# Patient Record
Sex: FEMALE | Race: WHITE | Employment: FULL TIME | URBAN - METROPOLITAN AREA
[De-identification: names, ages, dates, MRNs, and addresses within clinical notes are randomized per-mention and may not be internally consistent; named-entity substitution may affect disease eponyms.]

---

## 2019-09-11 ENCOUNTER — OFFICE VISIT (OUTPATIENT)
Dept: FAMILY MEDICINE CLINIC | Facility: CLINIC | Age: 44
End: 2019-09-11
Payer: COMMERCIAL

## 2019-09-11 VITALS
SYSTOLIC BLOOD PRESSURE: 116 MMHG | WEIGHT: 129.38 LBS | TEMPERATURE: 97 F | OXYGEN SATURATION: 99 % | HEIGHT: 63 IN | RESPIRATION RATE: 16 BRPM | BODY MASS INDEX: 22.93 KG/M2 | HEART RATE: 60 BPM | DIASTOLIC BLOOD PRESSURE: 64 MMHG

## 2019-09-11 DIAGNOSIS — Z97.5 IUD (INTRAUTERINE DEVICE) IN PLACE: ICD-10-CM

## 2019-09-11 DIAGNOSIS — Z00.00 ANNUAL PHYSICAL EXAM: Primary | ICD-10-CM

## 2019-09-11 PROCEDURE — 87624 HPV HI-RISK TYP POOLED RSLT: CPT | Performed by: FAMILY MEDICINE

## 2019-09-11 PROCEDURE — 88175 CYTOPATH C/V AUTO FLUID REDO: CPT | Performed by: FAMILY MEDICINE

## 2019-09-11 PROCEDURE — 99386 PREV VISIT NEW AGE 40-64: CPT | Performed by: FAMILY MEDICINE

## 2019-09-11 NOTE — PROGRESS NOTES
CC: Annual Physical Exam    HPI:   David Soni is a 45year old female who presents for a complete physical exam. Symptoms: periods are regular. Pt with need for health care.  Unsure when IUD placed , unsure if it was good for 5 or 10 years-- divorce 5 yellow sclerae. Ears, Nose, Throat:  Denies hearing loss, sneezing, congestion, runny nose or sore throat. INTEGUMENTARY:  Denies rashes, itching, skin lesion, or excessive skin dryness.   CARDIOVASCULAR:  Denies chest pain, chest pressure, chest discomfor turgor. HEART:  Regular rate and rhythm, no murmurs, rubs or gallops. LUNGS: Clear to auscultation bilaterally, no rales/rhonchi/wheezing. BREAST: No tenderness, no masses, no lesion, no nipple discharge, no axillary lymphadopathy  CHEST: No tenderness. fall unless you have a contraindication. Pt' s weight is Body mass index is 22.92 kg/m². , recommended low fat diet and aerobic exercise 30 minutes three times weekly. The patient indicates understanding of these issues and agrees to the plan.   The christiana

## 2019-09-11 NOTE — PATIENT INSTRUCTIONS
rec fasting labs    continue healthy diet and exercsise    Pap today    D/w patient getting records on date and type of IUD

## 2019-09-12 LAB — HPV I/H RISK 1 DNA SPEC QL NAA+PROBE: NEGATIVE

## 2019-09-13 ENCOUNTER — TELEPHONE (OUTPATIENT)
Dept: FAMILY MEDICINE CLINIC | Facility: CLINIC | Age: 44
End: 2019-09-13

## 2019-09-13 NOTE — TELEPHONE ENCOUNTER
Pt was seen on 9/11/19. Please see fax from [facility]. Pt had Paragard placed on 2/6/12. Exp: 7/17. Pt states she was confused. Pt states she thought device was good for 10 years, but then she noticed to expiration date for device.

## 2019-09-13 NOTE — TELEPHONE ENCOUNTER
paraguard is good for 10 years,  Expiration date Is in relation to insertion date.    Pt should have removed/ replaced in 2022

## 2019-09-17 ENCOUNTER — TELEPHONE (OUTPATIENT)
Dept: FAMILY MEDICINE CLINIC | Facility: CLINIC | Age: 44
End: 2019-09-17

## 2019-09-17 NOTE — TELEPHONE ENCOUNTER
----- Message from Navi Sanchez MD sent at 9/17/2019  1:58 PM CDT -----  Normal pap, negative HPV screen.  Repeat 3-5 years

## 2019-09-28 ENCOUNTER — LABORATORY ENCOUNTER (OUTPATIENT)
Dept: LAB | Age: 44
End: 2019-09-28
Attending: FAMILY MEDICINE
Payer: COMMERCIAL

## 2019-09-28 DIAGNOSIS — Z00.00 ANNUAL PHYSICAL EXAM: ICD-10-CM

## 2019-09-28 LAB
ALBUMIN SERPL-MCNC: 3.9 G/DL (ref 3.4–5)
ALBUMIN/GLOB SERPL: 1.2 {RATIO} (ref 1–2)
ALP LIVER SERPL-CCNC: 56 U/L (ref 37–98)
ALT SERPL-CCNC: 19 U/L (ref 13–56)
ANION GAP SERPL CALC-SCNC: 5 MMOL/L (ref 0–18)
AST SERPL-CCNC: 19 U/L (ref 15–37)
BASOPHILS # BLD AUTO: 0.06 X10(3) UL (ref 0–0.2)
BASOPHILS NFR BLD AUTO: 0.9 %
BILIRUB SERPL-MCNC: 0.6 MG/DL (ref 0.1–2)
BUN BLD-MCNC: 7 MG/DL (ref 7–18)
BUN/CREAT SERPL: 8.6 (ref 10–20)
CALCIUM BLD-MCNC: 9.2 MG/DL (ref 8.5–10.1)
CHLORIDE SERPL-SCNC: 107 MMOL/L (ref 98–112)
CHOLEST SMN-MCNC: 156 MG/DL (ref ?–200)
CO2 SERPL-SCNC: 26 MMOL/L (ref 21–32)
CREAT BLD-MCNC: 0.81 MG/DL (ref 0.55–1.02)
DEPRECATED RDW RBC AUTO: 46.7 FL (ref 35.1–46.3)
EOSINOPHIL # BLD AUTO: 0.15 X10(3) UL (ref 0–0.7)
EOSINOPHIL NFR BLD AUTO: 2.2 %
ERYTHROCYTE [DISTWIDTH] IN BLOOD BY AUTOMATED COUNT: 13.7 % (ref 11–15)
GLOBULIN PLAS-MCNC: 3.2 G/DL (ref 2.8–4.4)
GLUCOSE BLD-MCNC: 80 MG/DL (ref 70–99)
HCT VFR BLD AUTO: 42 % (ref 35–48)
HDLC SERPL-MCNC: 74 MG/DL (ref 40–59)
HGB BLD-MCNC: 13.4 G/DL (ref 12–16)
IMM GRANULOCYTES # BLD AUTO: 0.02 X10(3) UL (ref 0–1)
IMM GRANULOCYTES NFR BLD: 0.3 %
LDLC SERPL CALC-MCNC: 70 MG/DL (ref ?–100)
LYMPHOCYTES # BLD AUTO: 1.85 X10(3) UL (ref 1–4)
LYMPHOCYTES NFR BLD AUTO: 27 %
M PROTEIN MFR SERPL ELPH: 7.1 G/DL (ref 6.4–8.2)
MCH RBC QN AUTO: 29.7 PG (ref 26–34)
MCHC RBC AUTO-ENTMCNC: 31.9 G/DL (ref 31–37)
MCV RBC AUTO: 93.1 FL (ref 80–100)
MONOCYTES # BLD AUTO: 0.67 X10(3) UL (ref 0.1–1)
MONOCYTES NFR BLD AUTO: 9.8 %
NEUTROPHILS # BLD AUTO: 4.11 X10 (3) UL (ref 1.5–7.7)
NEUTROPHILS # BLD AUTO: 4.11 X10(3) UL (ref 1.5–7.7)
NEUTROPHILS NFR BLD AUTO: 59.8 %
NONHDLC SERPL-MCNC: 82 MG/DL (ref ?–130)
OSMOLALITY SERPL CALC.SUM OF ELEC: 283 MOSM/KG (ref 275–295)
PLATELET # BLD AUTO: 228 10(3)UL (ref 150–450)
POTASSIUM SERPL-SCNC: 4.4 MMOL/L (ref 3.5–5.1)
RBC # BLD AUTO: 4.51 X10(6)UL (ref 3.8–5.3)
SODIUM SERPL-SCNC: 138 MMOL/L (ref 136–145)
TRIGL SERPL-MCNC: 62 MG/DL (ref 30–149)
TSI SER-ACNC: 0.92 MIU/ML (ref 0.36–3.74)
VLDLC SERPL CALC-MCNC: 12 MG/DL (ref 0–30)
WBC # BLD AUTO: 6.9 X10(3) UL (ref 4–11)

## 2019-09-28 PROCEDURE — 80050 GENERAL HEALTH PANEL: CPT | Performed by: FAMILY MEDICINE

## 2019-09-28 PROCEDURE — 80061 LIPID PANEL: CPT | Performed by: FAMILY MEDICINE

## 2019-09-28 PROCEDURE — 36415 COLL VENOUS BLD VENIPUNCTURE: CPT | Performed by: FAMILY MEDICINE

## 2019-09-30 ENCOUNTER — TELEPHONE (OUTPATIENT)
Dept: FAMILY MEDICINE CLINIC | Facility: CLINIC | Age: 44
End: 2019-09-30

## 2019-09-30 NOTE — TELEPHONE ENCOUNTER
Pt informed. Pt states she was not able to provide a urine sample due to being on her cycle. Pt states she was told that she couldn't provide a sample. Pt denied any urinary s/s- pt asked if we could hold off on this. Okay to remove?     Pt informed

## 2019-09-30 NOTE — TELEPHONE ENCOUNTER
----- Message from Michael Mcneil MD sent at 9/29/2019  9:31 PM CDT -----  Lab results reviewed  Chemistry panel- normal  Thyroid normal   lipids-normal  Cbc-normal  Encourage healthy diet and exercise.

## 2019-10-01 ENCOUNTER — TELEPHONE (OUTPATIENT)
Dept: FAMILY MEDICINE CLINIC | Facility: CLINIC | Age: 44
End: 2019-10-01

## 2019-10-01 NOTE — TELEPHONE ENCOUNTER
Pt informed that biometric screening form was faxed yesterday to fax # provided on form, [phone]. Pt states form was not received. Re-faxed today.

## 2019-10-11 ENCOUNTER — TELEPHONE (OUTPATIENT)
Dept: FAMILY MEDICINE CLINIC | Facility: CLINIC | Age: 44
End: 2019-10-11

## 2019-10-11 ENCOUNTER — OFFICE VISIT (OUTPATIENT)
Dept: FAMILY MEDICINE CLINIC | Facility: CLINIC | Age: 44
End: 2019-10-11
Payer: COMMERCIAL

## 2019-10-11 VITALS
RESPIRATION RATE: 16 BRPM | BODY MASS INDEX: 21.65 KG/M2 | WEIGHT: 122.19 LBS | OXYGEN SATURATION: 98 % | TEMPERATURE: 98 F | HEART RATE: 82 BPM | DIASTOLIC BLOOD PRESSURE: 62 MMHG | HEIGHT: 63 IN | SYSTOLIC BLOOD PRESSURE: 116 MMHG

## 2019-10-11 DIAGNOSIS — Z41.1 ENCOUNTER FOR BREAST AUGMENTATION: Primary | ICD-10-CM

## 2019-10-11 DIAGNOSIS — Z01.818 PREOP EXAMINATION: ICD-10-CM

## 2019-10-11 PROCEDURE — 93000 ELECTROCARDIOGRAM COMPLETE: CPT | Performed by: FAMILY MEDICINE

## 2019-10-11 PROCEDURE — 99215 OFFICE O/P EST HI 40 MIN: CPT | Performed by: FAMILY MEDICINE

## 2019-10-11 PROCEDURE — 85610 PROTHROMBIN TIME: CPT | Performed by: FAMILY MEDICINE

## 2019-10-11 PROCEDURE — 36415 COLL VENOUS BLD VENIPUNCTURE: CPT | Performed by: FAMILY MEDICINE

## 2019-10-11 PROCEDURE — 85730 THROMBOPLASTIN TIME PARTIAL: CPT | Performed by: FAMILY MEDICINE

## 2019-10-11 NOTE — TELEPHONE ENCOUNTER
Pt seen today  Note pt requested today at appt was faxed to contact provided . FYI: pt scheduled appt for mammogram 10/24 at [place] [id number].

## 2019-10-11 NOTE — PROGRESS NOTES
[facility] [place]  PROGRESS NOTE  Chief Complaint:   Patient presents with:  Pre-Op Exam: Preoperative clearance for Breast augmentation with Dr. Meeta Garg on 10.28/19      HPI:   This is a 45year old female coming in for preoperative clearance for (MIRENA, 52 MG,) 20 MCG/24HR Intrauterine IUD 1 each by Intrauterine route once. Disp:  Rfl:       Counseling given: Not Answered       REVIEW OF SYSTEMS:   CONSTITUTIONAL:  Denies unusual weight gain/loss, fever, chills, or fatigue.   EENT:  Eyes:  Denies Eyes: EOMI, PERRLA, no scleral icterus, conjunctivae clear bilaterally, no eye discharge Ears: External normal. Nose: patent, no nasal discharge Throat:  No tonsillar erythema or exudate. Mouth:  No oral lesions or ulcerations, good dentition.   NECK: Supp notified to call with any questions, complications, allergies, or worsening or changing symptoms. Patient is to call with any side effects or complications from the treatments as a result of today.

## 2019-10-11 NOTE — PATIENT INSTRUCTIONS
Pt advised to have screening mammogram before any breast surgery-- rec screening mammogram preoperatively     EKg stable    rec labs preoperatively  Cbc and chemistry normal  PT/ ptt pending

## 2019-10-16 ENCOUNTER — TELEPHONE (OUTPATIENT)
Dept: FAMILY MEDICINE CLINIC | Facility: CLINIC | Age: 44
End: 2019-10-16

## 2019-10-16 DIAGNOSIS — Z12.31 BREAST CANCER SCREENING BY MAMMOGRAM: Primary | ICD-10-CM

## 2019-10-16 NOTE — TELEPHONE ENCOUNTER
has a mammo appointment set with jonah for the 24th, if  needs her to have it sooner they need an order,with it being breast cancer awareness month they are booked up

## 2019-10-16 NOTE — TELEPHONE ENCOUNTER
As long as it is done prior to the surgery–if there are abnormalities she may need to reschedule the surgery.

## 2019-10-16 NOTE — TELEPHONE ENCOUNTER
Pt had EKG in office on 10/11/19. Pt states she is able to see her EKG on my chart. Pt states she sees that it says Borderline with comment , left atrial enlargement. Pt was concerned about interpretation.   Pt informed that per CR -EKG was stable (not

## 2019-10-16 NOTE — TELEPHONE ENCOUNTER
Please let patient know that part of the value of an EKG is to look for stability versus changes. If it is unchanged–that is a good thing.   She can discuss this further with Dr. Dempsey Better when she returns.-I did not see an existing order for mammogram

## 2019-10-17 ENCOUNTER — TELEPHONE (OUTPATIENT)
Dept: FAMILY MEDICINE CLINIC | Facility: CLINIC | Age: 44
End: 2019-10-17

## 2019-10-17 NOTE — TELEPHONE ENCOUNTER
Patient is at the [address] [place] [address] Surgery, patient is requesting recent labs and echocardiogram to be sent right away to Fax [phone]

## 2019-10-17 NOTE — TELEPHONE ENCOUNTER
Patient is at the [address] [place] [address] Surgery, patient is requesting recent labs to be sent right away to Fax [phone]

## 2020-05-18 ENCOUNTER — TELEPHONE (OUTPATIENT)
Dept: FAMILY MEDICINE CLINIC | Facility: CLINIC | Age: 45
End: 2020-05-18

## 2020-05-18 NOTE — TELEPHONE ENCOUNTER
Reviewed. Preoperative testing is arranged by the surgeon and surgical location. That is not testing we do at this location. . Please have patient call her surgeon.

## 2020-05-18 NOTE — TELEPHONE ENCOUNTER
Checking on status of pre-surgery COVID test & blood clot test orders. Paperwork faxed & received by us per conversation with nurse on 5/15/2020. Surgery is 5/28/2020, so needs orders ASAP so surgeon can get the results.     Also is requesting a generic

## 2020-05-19 ENCOUNTER — TELEPHONE (OUTPATIENT)
Dept: FAMILY MEDICINE CLINIC | Facility: CLINIC | Age: 45
End: 2020-05-19

## 2020-05-19 DIAGNOSIS — Z00.00 ANNUAL PHYSICAL EXAM: Primary | ICD-10-CM

## 2020-05-19 NOTE — TELEPHONE ENCOUNTER
Pt has lab orders from DR. Ng for CBC, CMP, PT, and PTT to be done. - pt states she will bring lab order to lab appt on Thursday. Pt knows that she will also need lipid panel checked for her insurance biometric employment screening.   Pt would like to h

## 2020-05-19 NOTE — TELEPHONE ENCOUNTER
Please call Dr. Maureen Hurtado office call to have orders sent over via fax so they can be reviewed and cleared to be done here.  Ok to add lipids per patient request.

## 2020-05-19 NOTE — TELEPHONE ENCOUNTER
Re:  needs her Bio-[employer] blood work for work & form filled out  &  Has to have Pre-OP Blood work done also -  Can this be done at the same? ?

## 2020-05-19 NOTE — TELEPHONE ENCOUNTER
Vikki Ramirezlino states she doesn't need a clearance that she is aware of. Pt informed that Covid testing is not done here. Pt states she was told that lab orders can be processed by our lab and pt states she was told that we had orders.     Pt informed pre-op sta

## 2020-05-20 NOTE — TELEPHONE ENCOUNTER
Pt informed.       Future Appointments   Date Time Provider Giorgi Tameka   5/21/2020 10:45 AM [facility]   10/14/2020  6:00 PM Shelly Gaines MD [facility] [place]                  REF [facility]

## 2020-05-21 ENCOUNTER — LABORATORY ENCOUNTER (OUTPATIENT)
Dept: LAB | Age: 45
End: 2020-05-21
Attending: FAMILY MEDICINE
Payer: COMMERCIAL

## 2020-05-21 DIAGNOSIS — Z01.818 PRE-OP EXAMINATION: Primary | ICD-10-CM

## 2020-05-21 DIAGNOSIS — Z00.00 ANNUAL PHYSICAL EXAM: ICD-10-CM

## 2020-05-21 PROCEDURE — 80061 LIPID PANEL: CPT | Performed by: FAMILY MEDICINE

## 2020-05-22 ENCOUNTER — TELEPHONE (OUTPATIENT)
Dept: FAMILY MEDICINE CLINIC | Facility: CLINIC | Age: 45
End: 2020-05-22

## 2020-05-22 NOTE — TELEPHONE ENCOUNTER
Pt called re: lipid panel results drawn on 5/21/20. Reviewed per CR- was normal.  Pt informed. Pt verbalized understanding.

## 2020-09-09 ENCOUNTER — TELEPHONE (OUTPATIENT)
Dept: FAMILY MEDICINE CLINIC | Facility: CLINIC | Age: 45
End: 2020-09-09

## 2020-09-09 NOTE — TELEPHONE ENCOUNTER
Pt contacted. Pt states she went tubing on Sunday. Pt states she is having muscle/ chest discomfort. Pt states it feels like she worked out too much  Pt states she was sore as soon as she was done tubing. Pt has pain with position changes.     Pt brian

## 2020-09-09 NOTE — TELEPHONE ENCOUNTER
[facility] Nurse Pool   Caller: Unspecified (Today,  3:36 PM)             Called back to say she went to the ER. Thank you          Noted.

## 2020-09-09 NOTE — TELEPHONE ENCOUNTER
Patient believes that she pulled a muscle over the weekend because her chest has been hurting. pt wants to know if she should make an appt here or is she should go to the ER. Would like a call back.

## 2020-10-27 ENCOUNTER — OFFICE VISIT (OUTPATIENT)
Dept: FAMILY MEDICINE CLINIC | Facility: CLINIC | Age: 45
End: 2020-10-27
Payer: MEDICAID

## 2020-10-27 VITALS
BODY MASS INDEX: 21.09 KG/M2 | OXYGEN SATURATION: 100 % | RESPIRATION RATE: 18 BRPM | SYSTOLIC BLOOD PRESSURE: 112 MMHG | DIASTOLIC BLOOD PRESSURE: 64 MMHG | WEIGHT: 119 LBS | HEART RATE: 68 BPM | HEIGHT: 63 IN

## 2020-10-27 DIAGNOSIS — Z98.82 HISTORY OF BREAST AUGMENTATION: ICD-10-CM

## 2020-10-27 DIAGNOSIS — Z00.00 WELLNESS EXAMINATION: Primary | ICD-10-CM

## 2020-10-27 PROBLEM — Z97.5 IUD (INTRAUTERINE DEVICE) IN PLACE: Status: RESOLVED | Noted: 2019-09-11 | Resolved: 2020-10-27

## 2020-10-27 PROCEDURE — 99396 PREV VISIT EST AGE 40-64: CPT | Performed by: NURSE PRACTITIONER

## 2020-10-27 PROCEDURE — 3074F SYST BP LT 130 MM HG: CPT | Performed by: NURSE PRACTITIONER

## 2020-10-27 PROCEDURE — 3008F BODY MASS INDEX DOCD: CPT | Performed by: NURSE PRACTITIONER

## 2020-10-27 PROCEDURE — 3078F DIAST BP <80 MM HG: CPT | Performed by: NURSE PRACTITIONER

## 2020-10-27 NOTE — PATIENT INSTRUCTIONS
Healthy exam.    Reviewed labs done from 5 months ago, no acute findings noted. Recheck annually and as needed.

## 2020-10-27 NOTE — PROGRESS NOTES
HPI:   Ellis Troy is a 39year old female who presents for an Annual Health Visit. Present for annual exam. 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 Had an IUD.   States that she checked the kg/m². Physical Exam   Constitutional: She is oriented to person, place, and time. She appears well-developed and well-nourished. HENT:   Head: Normocephalic and atraumatic.    Right Ear: Hearing, tympanic membrane, external ear and ear canal normal.

## 2020-12-14 ENCOUNTER — TELEPHONE (OUTPATIENT)
Dept: FAMILY MEDICINE CLINIC | Facility: CLINIC | Age: 45
End: 2020-12-14

## 2020-12-14 NOTE — TELEPHONE ENCOUNTER
Pt has a few questions and needs to schedule to have an IUD placed by us. She originally wanted to see Tara Montalvo but she states she does not place those. Please c/b pt at work if its before 6:00PM. After that call her Cell.

## 2020-12-14 NOTE — TELEPHONE ENCOUNTER
Pt had wellness exam with Sixto Demarco NP. Pt states that she had a Paraguard IUD in place and her body naturally expelled IUD. Pt states she brought her IUD to her physical appt.     Pt states she started her period yesterday,  Pt would like to have a

## 2020-12-15 NOTE — TELEPHONE ENCOUNTER
Your appointments     Date & Time Appointment Department [facility])    Dec 16, 2020  4:15 PM CST Procedure with Manuela Mccartney MD [address], [facility]            [facility],

## 2020-12-15 NOTE — TELEPHONE ENCOUNTER
Pt informed. LMP: 12/13/20-  Pt states her cycle is very light today. Pt states she may not have a period by tomorrow. Pt states her cycles have become very irregular and she is not sure when her next cycle would come .   Pt wanted to double check that

## 2020-12-15 NOTE — TELEPHONE ENCOUNTER
Placement is recommended day 3-5 of cycle  Tomorrow should be fine ( day 4) per previous info provided by pt.

## 2020-12-15 NOTE — TELEPHONE ENCOUNTER
Pt contacted and scheduled for appt .       Future Appointments   Date Time Provider Giorgi Tameka   12/16/2020  4:15 PM Luzmaria Milton MD [facility]

## 2020-12-16 ENCOUNTER — OFFICE VISIT (OUTPATIENT)
Dept: FAMILY MEDICINE CLINIC | Facility: CLINIC | Age: 45
End: 2020-12-16
Payer: MEDICAID

## 2020-12-16 VITALS
SYSTOLIC BLOOD PRESSURE: 104 MMHG | RESPIRATION RATE: 16 BRPM | WEIGHT: 119.63 LBS | HEIGHT: 63 IN | DIASTOLIC BLOOD PRESSURE: 68 MMHG | HEART RATE: 72 BPM | TEMPERATURE: 98 F | OXYGEN SATURATION: 99 % | BODY MASS INDEX: 21.2 KG/M2

## 2020-12-16 DIAGNOSIS — Z97.5 IUD CONTRACEPTION: Primary | ICD-10-CM

## 2020-12-16 DIAGNOSIS — Z30.430 ENCOUNTER FOR INSERTION OF INTRAUTERINE CONTRACEPTIVE DEVICE: ICD-10-CM

## 2020-12-16 PROCEDURE — 3074F SYST BP LT 130 MM HG: CPT | Performed by: FAMILY MEDICINE

## 2020-12-16 PROCEDURE — 3078F DIAST BP <80 MM HG: CPT | Performed by: FAMILY MEDICINE

## 2020-12-16 PROCEDURE — 3008F BODY MASS INDEX DOCD: CPT | Performed by: FAMILY MEDICINE

## 2020-12-16 PROCEDURE — 58300 INSERT INTRAUTERINE DEVICE: CPT | Performed by: FAMILY MEDICINE

## 2020-12-16 RX ORDER — COPPER 313.4 MG/1
1 INTRAUTERINE DEVICE INTRAUTERINE ONCE
Status: COMPLETED | OUTPATIENT
Start: 2020-12-16 | End: 2020-12-16

## 2020-12-16 RX ADMIN — COPPER 1 DEVICE: 313.4 INTRAUTERINE DEVICE INTRAUTERINE at 18:01:00

## 2020-12-16 NOTE — PROGRESS NOTES
CC: Annual Physical Exam    HPI:   Amita Post is a 39year old female who presents for a complete physical exam.     Pt with irregular menese-- logan other month- light, more regular-- each month- last 2 month    Pt generally liked IUD- would like benson MCHC 32.9 31.0 - 37.0 g/dL    RDW 12.6 11.0 - 15.0 %    RDW-SD 43.1 35.1 - 46.3 fL   PROTHROMBIN TIME (PT)   Result Value Ref Range    PT 13.1 12.4 - 14.6 seconds    INR 0.96 0.89 - 1.11   PTT, ACTIVATED   Result Value Ref Range    PTT 32.0 25.4 - 36.1 sec as of this encounter: 5' 3\" (1.6 m). Weight as of this encounter: 119 lb 9.6 oz (54.3 kg). Vital signs reviewed. Appears stated age, well groomed.   Physical Exam:  GEN:  Patient is alert, awake and oriented, well developed, well nourished, no apparent physical exam.  Pap and pelvic done. Self breast exam explained. Health maintenance, will check : No orders of the defined types were placed in this encounter. There are no diagnoses linked to this encounter.   There are no Patient Instructions on fi

## 2022-05-16 ENCOUNTER — TELEPHONE (OUTPATIENT)
Dept: FAMILY MEDICINE CLINIC | Facility: CLINIC | Age: 47
End: 2022-05-16

## 2022-05-17 NOTE — TELEPHONE ENCOUNTER
For about a week. Patient notes that she popped the blister and is now concerned that it could be infected. Patient states that she had increased pain and swelling after running yesterday she went to work today felt it was uncomfortable in her shoes patient went to the gym worked out and and now this evening feels that its red and has a red streak going up her calf. Patient denies any fever or chills. Discussed with patient concerns for cellulitis recommended acute evaluation at urgent care or emergency room. Patient unsure if she would go to have her foot  evaluated today or wait till tomorrow. Patient cautioned that infection could worsen and cause a bigger problem if left untreated. Prescription of antibiotic would require evaluation appointment and clinical evaluation at the office if she chooses not to go to urgent care. Pt recoomended to call office in am or go to ED/urgent care this evening

## (undated) NOTE — LETTER
Date: 10/11/2019    Patient Name: Pineda Tavarez          To Whom it may concern: This letter has been written at the patient's request. The above patient was seen at the [facility] for treatment of a medical condition.     This patient shou